# Patient Record
Sex: FEMALE | Race: BLACK OR AFRICAN AMERICAN | NOT HISPANIC OR LATINO | URBAN - METROPOLITAN AREA
[De-identification: names, ages, dates, MRNs, and addresses within clinical notes are randomized per-mention and may not be internally consistent; named-entity substitution may affect disease eponyms.]

---

## 2017-11-15 ENCOUNTER — APPOINTMENT (RX ONLY)
Dept: URBAN - METROPOLITAN AREA CLINIC 106 | Facility: CLINIC | Age: 32
Setting detail: DERMATOLOGY
End: 2017-11-15

## 2017-11-15 DIAGNOSIS — B35.4 TINEA CORPORIS: ICD-10-CM

## 2017-11-15 DIAGNOSIS — L70.0 ACNE VULGARIS: ICD-10-CM

## 2017-11-15 PROBLEM — L20.84 INTRINSIC (ALLERGIC) ECZEMA: Status: ACTIVE | Noted: 2017-11-15

## 2017-11-15 PROBLEM — L08.9 LOCAL INFECTION OF THE SKIN AND SUBCUTANEOUS TISSUE, UNSPECIFIED: Status: ACTIVE | Noted: 2017-11-15

## 2017-11-15 PROCEDURE — 87220 TISSUE EXAM FOR FUNGI: CPT

## 2017-11-15 PROCEDURE — ? KOH PREP

## 2017-11-15 PROCEDURE — ? PRESCRIPTION

## 2017-11-15 PROCEDURE — ? OTHER

## 2017-11-15 PROCEDURE — ? SEPARATE AND IDENTIFIABLE DOCUMENTATION

## 2017-11-15 PROCEDURE — 99202 OFFICE O/P NEW SF 15 MIN: CPT

## 2017-11-15 PROCEDURE — ? COUNSELING

## 2017-11-15 PROCEDURE — ? EDUCATIONAL RESOURCES PROVIDED

## 2017-11-15 PROCEDURE — ? TREATMENT REGIMEN

## 2017-11-15 RX ORDER — MINOCYCLINE HYDROCHLORIDE 100 MG/1
CAPSULE ORAL
Qty: 60 | Refills: 1 | Status: ERX | COMMUNITY
Start: 2017-11-15

## 2017-11-15 RX ORDER — CLINDAMYCIN PHOSPHATE AND BENZOYL PEROXIDE 10; 37.5 MG/G; MG/G
GEL TOPICAL
Qty: 1 | Refills: 2 | Status: ERX | COMMUNITY
Start: 2017-11-15

## 2017-11-15 RX ORDER — TRIAMCINOLONE ACETONIDE 1 MG/G
CREAM TOPICAL BID
Qty: 1 | Refills: 3 | Status: ERX | COMMUNITY
Start: 2017-11-15

## 2017-11-15 RX ADMIN — MINOCYCLINE HYDROCHLORIDE: 100 CAPSULE ORAL at 00:00

## 2017-11-15 RX ADMIN — CLINDAMYCIN PHOSPHATE AND BENZOYL PEROXIDE: 10; 37.5 GEL TOPICAL at 00:00

## 2017-11-15 RX ADMIN — TRIAMCINOLONE ACETONIDE: 1 CREAM TOPICAL at 00:00

## 2017-11-15 ASSESSMENT — LOCATION SIMPLE DESCRIPTION DERM
LOCATION SIMPLE: RIGHT UPPER BACK
LOCATION SIMPLE: LEFT CHEEK
LOCATION SIMPLE: LEFT LOWER BACK

## 2017-11-15 ASSESSMENT — LOCATION ZONE DERM
LOCATION ZONE: TRUNK
LOCATION ZONE: FACE

## 2017-11-15 ASSESSMENT — LOCATION DETAILED DESCRIPTION DERM
LOCATION DETAILED: RIGHT SUPERIOR MEDIAL UPPER BACK
LOCATION DETAILED: LEFT INFERIOR LATERAL MIDBACK
LOCATION DETAILED: LEFT INFERIOR CENTRAL MALAR CHEEK

## 2017-11-15 ASSESSMENT — SEVERITY ASSESSMENT: SEVERITY: MILD TO MODERATE

## 2017-11-15 ASSESSMENT — BSA RASH: BSA RASH: 2

## 2017-11-15 NOTE — PROCEDURE: OTHER
Detail Level: Simple
Note Text (......Xxx Chief Complaint.): This diagnosis correlates with the
Other (Free Text): Patient's KOH prep revealed no significant findings.  She continuously applies fragranced products to her skin, and will begin a sensitive skin regimen immediately.  Sensitive skin precautions were emphasized.

## 2017-11-15 NOTE — HPI: RASH
How Severe Is Your Rash?: moderate
Is This A New Presentation, Or A Follow-Up?: Rash
Additional History: Patient states that OTC cortisone did not help at all.

## 2017-11-15 NOTE — PROCEDURE: TREATMENT REGIMEN
Plan: Patient will begin treatment with triamcinolone as prescribed. She will also begin free and clear protocols immediately to hopefully aid in reduction of flares. If this regimen does not control her flares we will refer her to an allergist for allergy testing
Plan: Patient will begin treating with onexton applied twice daily and washing face with OTC acne facewash. She will also treat with minocycline taking one capsule twice daily for a few weeks until flare subsides (pulse therapy). patient will follow up in 2-3 months for reevaluation
Detail Level: Zone

## 2017-11-15 NOTE — PROCEDURE: KOH PREP
Showing: no pathologic findings
Detail Level: Simple
Clia Id #: [id number]
Koh Intro Text (From The.....): A KOH prep was ordered and evaluated from the

## 2017-11-20 ENCOUNTER — RX ONLY (OUTPATIENT)
Age: 32
Setting detail: RX ONLY
End: 2017-11-20

## 2017-11-20 RX ORDER — NICOTINE POLACRILEX 2 MG
GUM BUCCAL
Qty: 3 | Refills: 1 | Status: ERX | COMMUNITY
Start: 2017-11-20

## 2017-12-21 ENCOUNTER — RX ONLY (OUTPATIENT)
Age: 32
Setting detail: RX ONLY
End: 2017-12-21